# Patient Record
Sex: MALE | Race: WHITE
[De-identification: names, ages, dates, MRNs, and addresses within clinical notes are randomized per-mention and may not be internally consistent; named-entity substitution may affect disease eponyms.]

---

## 2017-09-18 ENCOUNTER — HOSPITAL ENCOUNTER (OUTPATIENT)
Dept: HOSPITAL 62 - SP | Age: 67
End: 2017-09-18
Attending: NURSE PRACTITIONER
Payer: OTHER GOVERNMENT

## 2017-09-18 DIAGNOSIS — L97.322: Primary | ICD-10-CM

## 2017-09-18 PROCEDURE — 93925 LOWER EXTREMITY STUDY: CPT

## 2017-09-18 NOTE — XCELERA REPORT
[address] [address]

                             Tel: [phone]

                             Fax: [phone]



                    Lower Extremity Arterial Evaluation

____________________________________________________________________________



Name: JACOB BUSBY

MRN: [id number]                Age: 67 yrs

Gender: Male                   : 1950

Patient Status: Outpatient     Patient Location: 

Account #: [id number]

Study Date: 2017 01:12 PM

Accession #: [id number]

____________________________________________________________________________



Procedure: A color flow and duplex scan of the lower extremity arteries was

performed bilaterally with velocity and waveform anaylsis. Ankle brachial

indicies performed.

Reason For Study: ULCER





Ordering Physician: AMY CABRERA

Performed By: Sarahy Alejandro

____________________________________________________________________________



____________________________________________________________________________





Measurements and Calculations



                                   Right         Left

  CFA PSV                          105.6        124.0    cm/sec

  Prox PFA PSV                      42.9        115.0    cm/sec

  Prox SFA PSV                     102.0        107.5    cm/sec

  Mid SFA PSV                      -124.3       101.2    cm/sec

  Dist SFA PSV                     -85.5        -106.9   cm/sec

  Prox Pop A PSV                    53.4         58.5    cm/sec

  Dist QUITA PSV                      99.2        110.0    cm/sec

  Dist PTA PSV                     101.7        117.9    cm/sec

  Vinicio Pedis PSV                     99.2         88.9    cm/sec



____________________________________________________________________________



Right Side Arterial Evaluation

Normal velocity and triphasic waveforms noted from the Common Femoral

artery to the infrageniculate vessels.



0 % stenosis noted .



Ankle Brachial index was not obtainable due to non compressibility.



Left Side Arterial Evaluation

Normal velocity and triphasic waveforms noted from the Common Femoral

artery to the infrageniculate vessels.



0 % stenosis noted .



Ankle Brachial index was not obtainable due to non compressibility.

____________________________________________________________________________



Interpretation Summary

No hemodynamically significant lesions in the bilateral lower extremities,

on duplex imaging, at rest. In spite of normal duplex study, non

compressibility, suggests Atherosclerotic changes in the arterial wall.

____________________________________________________________________________



Electronically signed by:      Lennox Williams      on 2017 04:19 PM



CC: GAVEL, EMILY > Williams, Lennox

## 2017-10-31 ENCOUNTER — HOSPITAL ENCOUNTER (OUTPATIENT)
Dept: HOSPITAL 62 - WC | Age: 67
End: 2017-10-31
Attending: NURSE PRACTITIONER
Payer: MEDICARE

## 2017-10-31 DIAGNOSIS — E11.621: Primary | ICD-10-CM

## 2017-10-31 DIAGNOSIS — L97.322: ICD-10-CM

## 2017-10-31 LAB
ALBUMIN SERPL-MCNC: 4.5 G/DL (ref 3.5–5)
ALP SERPL-CCNC: 102 U/L (ref 38–126)
ALT SERPL-CCNC: 30 U/L (ref 21–72)
ANION GAP SERPL CALC-SCNC: 13 MMOL/L (ref 5–19)
AST SERPL-CCNC: 20 U/L (ref 17–59)
BASOPHILS # BLD AUTO: 0 10^3/UL (ref 0–0.2)
BASOPHILS NFR BLD AUTO: 0.7 % (ref 0–2)
BILIRUB DIRECT SERPL-MCNC: 0.4 MG/DL (ref 0–0.4)
BILIRUB SERPL-MCNC: 0.5 MG/DL (ref 0.2–1.3)
BUN SERPL-MCNC: 27 MG/DL (ref 7–20)
CALCIUM: 9.2 MG/DL (ref 8.4–10.2)
CHLORIDE SERPL-SCNC: 103 MMOL/L (ref 98–107)
CO2 SERPL-SCNC: 29 MMOL/L (ref 22–30)
CREAT SERPL-MCNC: 1.22 MG/DL (ref 0.52–1.25)
CRP SERPL-MCNC: 9.5 MG/L (ref ?–10)
EOSINOPHIL # BLD AUTO: 0.1 10^3/UL (ref 0–0.6)
EOSINOPHIL NFR BLD AUTO: 2.2 % (ref 0–6)
ERYTHROCYTE [DISTWIDTH] IN BLOOD BY AUTOMATED COUNT: 16.2 % (ref 11.5–14)
ERYTHROCYTE [SEDIMENTATION RATE] IN BLOOD: 17 MM/HR (ref 0–20)
GLUCOSE SERPL-MCNC: 127 MG/DL (ref 75–110)
HCT VFR BLD CALC: 49 % (ref 37.9–51)
HGB BLD-MCNC: 16.4 G/DL (ref 13.5–17)
HGB HCT DIFFERENCE: 0.2
LYMPHOCYTES # BLD AUTO: 1.4 10^3/UL (ref 0.5–4.7)
LYMPHOCYTES NFR BLD AUTO: 20.6 % (ref 13–45)
MCH RBC QN AUTO: 31.8 PG (ref 27–33.4)
MCHC RBC AUTO-ENTMCNC: 33.5 G/DL (ref 32–36)
MCV RBC AUTO: 95 FL (ref 80–97)
MONOCYTES # BLD AUTO: 0.6 10^3/UL (ref 0.1–1.4)
MONOCYTES NFR BLD AUTO: 9.8 % (ref 3–13)
NEUTROPHILS # BLD AUTO: 4.4 10^3/UL (ref 1.7–8.2)
NEUTS SEG NFR BLD AUTO: 66.7 % (ref 42–78)
POTASSIUM SERPL-SCNC: 5.5 MMOL/L (ref 3.6–5)
PROT SERPL-MCNC: 7.7 G/DL (ref 6.3–8.2)
RBC # BLD AUTO: 5.17 10^6/UL (ref 4.35–5.55)
SODIUM SERPL-SCNC: 144.7 MMOL/L (ref 137–145)
WBC # BLD AUTO: 6.6 10^3/UL (ref 4–10.5)

## 2017-10-31 PROCEDURE — 36415 COLL VENOUS BLD VENIPUNCTURE: CPT

## 2017-10-31 PROCEDURE — 80053 COMPREHEN METABOLIC PANEL: CPT

## 2017-10-31 PROCEDURE — 83036 HEMOGLOBIN GLYCOSYLATED A1C: CPT

## 2017-10-31 PROCEDURE — 85025 COMPLETE CBC W/AUTO DIFF WBC: CPT

## 2017-10-31 PROCEDURE — 86140 C-REACTIVE PROTEIN: CPT

## 2017-10-31 PROCEDURE — 85652 RBC SED RATE AUTOMATED: CPT

## 2017-10-31 NOTE — RADIOLOGY REPORT (SQ)
EXAM DESCRIPTION:  FOOT LEFT COMPLETE



COMPLETED DATE/TIME:  10/31/2017 3:47 pm



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



COMPARISON:  None.



NUMBER OF VIEWS:  Three views.



TECHNIQUE:  AP, lateral and oblique  radiographic images acquired of the left foot.



LIMITATIONS:  None.



FINDINGS:  MINERALIZATION: Normal.

BONES: No fracture or dislocation.  No evidence of osteomyelitis.

JOINTS: No effusions.

SOFT TISSUES: No soft tissue swelling.  No foreign body.

OTHER: No other significant finding.



IMPRESSION:  There is no evidence of osteomyelitis.



TECHNICAL DOCUMENTATION:  JOB ID:  [id number]

 2011 [employer]- All Rights Reserved

## 2017-10-31 NOTE — RADIOLOGY REPORT (SQ)
EXAM DESCRIPTION:  ANKLE LEFT COMPLETE



COMPLETED DATE/TIME:  10/31/2017 3:47 pm



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



COMPARISON:  3/23/2015



NUMBER OF VIEWS:  Three views.



TECHNIQUE:  AP, lateral, and oblique radiographic images acquired of the left ankle.



LIMITATIONS:  None.



FINDINGS:  MINERALIZATION: Normal.

BONES: No fracture or dislocation.  No evidence of osteomyelitis.

JOINTS: No effusions.

SOFT TISSUES: No soft tissue swelling. No foreign body.

OTHER: No other significant finding.



IMPRESSION:  No acute abnormality.  There is no evidence of osteomyelitis



TECHNICAL DOCUMENTATION:  JOB ID:  [id number]

 2011 [employer]- All Rights Reserved

## 2019-02-26 ENCOUNTER — HOSPITAL ENCOUNTER (OUTPATIENT)
Dept: HOSPITAL 62 - SC | Age: 69
Discharge: HOME | End: 2019-02-26
Attending: OPHTHALMOLOGY
Payer: MEDICARE

## 2019-02-26 DIAGNOSIS — Z79.899: ICD-10-CM

## 2019-02-26 DIAGNOSIS — I11.9: ICD-10-CM

## 2019-02-26 DIAGNOSIS — G47.30: ICD-10-CM

## 2019-02-26 DIAGNOSIS — Z79.01: ICD-10-CM

## 2019-02-26 DIAGNOSIS — E11.3393: ICD-10-CM

## 2019-02-26 DIAGNOSIS — J44.9: ICD-10-CM

## 2019-02-26 DIAGNOSIS — H53.021: ICD-10-CM

## 2019-02-26 DIAGNOSIS — H17.89: ICD-10-CM

## 2019-02-26 DIAGNOSIS — I25.2: ICD-10-CM

## 2019-02-26 DIAGNOSIS — Z99.81: ICD-10-CM

## 2019-02-26 DIAGNOSIS — E78.00: ICD-10-CM

## 2019-02-26 DIAGNOSIS — H25.813: Primary | ICD-10-CM

## 2019-02-26 DIAGNOSIS — H52.4: ICD-10-CM

## 2019-02-26 DIAGNOSIS — Z79.51: ICD-10-CM

## 2019-02-26 DIAGNOSIS — Z79.4: ICD-10-CM

## 2019-02-26 DIAGNOSIS — H04.123: ICD-10-CM

## 2019-02-26 DIAGNOSIS — F17.210: ICD-10-CM

## 2019-02-26 PROCEDURE — 82962 GLUCOSE BLOOD TEST: CPT

## 2019-02-26 PROCEDURE — V2787 ASTIGMATISM-CORRECT FUNCTION: HCPCS

## 2019-02-26 PROCEDURE — 66984 XCAPSL CTRC RMVL W/O ECP: CPT

## 2019-02-26 RX ADMIN — TROPICAMIDE PRN DROP: 10 SOLUTION/ DROPS OPHTHALMIC at 07:52

## 2019-02-26 RX ADMIN — CYCLOPENTOLATE HYDROCHLORIDE AND PHENYLEPHRINE HYDROCHLORIDE PRN DROP: 2; 10 SOLUTION/ DROPS OPHTHALMIC at 07:52

## 2019-02-26 RX ADMIN — BESIFLOXACIN PRN DROP: 6 SUSPENSION OPHTHALMIC at 08:56

## 2019-02-26 RX ADMIN — TETRACAINE HYDROCHLORIDE PRN DROP: 25 LIQUID OPHTHALMIC at 07:52

## 2019-02-26 RX ADMIN — CYCLOPENTOLATE HYDROCHLORIDE AND PHENYLEPHRINE HYDROCHLORIDE PRN DROP: 2; 10 SOLUTION/ DROPS OPHTHALMIC at 08:15

## 2019-02-26 RX ADMIN — BESIFLOXACIN PRN DROP: 6 SUSPENSION OPHTHALMIC at 08:02

## 2019-02-26 RX ADMIN — CYCLOPENTOLATE HYDROCHLORIDE AND PHENYLEPHRINE HYDROCHLORIDE PRN DROP: 2; 10 SOLUTION/ DROPS OPHTHALMIC at 08:02

## 2019-02-26 RX ADMIN — TROPICAMIDE PRN DROP: 10 SOLUTION/ DROPS OPHTHALMIC at 08:15

## 2019-02-26 RX ADMIN — TROPICAMIDE PRN DROP: 10 SOLUTION/ DROPS OPHTHALMIC at 08:02

## 2019-02-26 RX ADMIN — BESIFLOXACIN PRN DROP: 6 SUSPENSION OPHTHALMIC at 07:52

## 2019-02-26 RX ADMIN — TETRACAINE HYDROCHLORIDE PRN DROP: 25 LIQUID OPHTHALMIC at 08:15

## 2019-02-26 NOTE — [facility] OPERATIVE REPORT E
[facility] Operative Report



NAME: JACOB BUSBY

                                      MRN: [id number]

                                      AGE: 68Y

DATE OF SURGERY: 02/26/2019          ROOM:



PREOPERATIVE DIAGNOSIS:

Cataract, left eye.



POSTOPERATIVE DIAGNOSIS:

Cataract, left eye.



PROCEDURE PERFORMED:

Phacoemulsification with toric intraocular lens implant, left eye.



SURGEON:

TU OTTO M.D.



ANESTHESIA:

Topical with MAC.



PROCEDURE:

The patient was brought to the operating room and placed on the operative

table.  Following tetracaine drops, topical anesthesia was administered. 

This consisted of instrument wipe pledgets soaked in a solution of 4%

Xylocaine mixed with 0.75% Marcaine in a 1:2 ratio.  A 2 x 1 cm pledget was

placed in the superior fornix.  A 1 x 1 cm pledget was placed in the

inferior fornix.  The eye was patched shut for 5 minutes.  The patch was

removed.  The eye was sterilely prepped and draped in the usual manner. 

Lid speculum was placed in the eye.  The pledgets were removed and 4-0

black silk sutures were placed around the superior and the inferior rectus

muscles to be used as traction.  A conjunctival peritomy was made at the 10

o'clock position.  Hemostasis was obtained with bipolar cautery.  A

posterior limbal groove was created using a crescent knife and dissected

anteriorly towards the cornea.  A sharp point blade was used to create a

paracentesis site at the 2 o'clock position.  A 2.4 mm keratome was used to

enter the anterior chamber through the groove.  Viscoelastic was injected

into the anterior chamber.  An anterior capsulotomy was performed using

Utrata forceps in a capsulorrhexis fashion.  Hydrodissection and

hydrodelineation were performed.  Phacoemulsification was performed in

divide-and-conquer technique.  Total phaco time was 9.51 CDE.



Following this, the I/A unit was used to remove residual cortex.

Viscoelastic was injected into the capsular bag.  Intraocular lens model

[id number], 20.5 diopters, serial number [id number], was placed in the

capsular bag.  The I/A unit was used to remove residual viscoelastic.  The

wound was seen to be watertight under high and low pressure, and no sutures

were placed.  The intraocular lens was well centered.  The pressure was

adjusted in the eye to normal pressure.  The 4-0 black silk sutures and lid

speculum were removed.  A drop of Cosopt was placed in the eye at the end

of surgery.  The eye was shielded after Besivance drops were placed.  The

patient tolerated the procedure well and was sent to the recovery room in

good condition.



Prior to the surgery, the patient was placed in the seated position and the

0, 270, and 180-degree axis of the eye was marked using a marking level. 

Prior to placing the lens implant the 106 axis was marked on the eye and

the lens was centered at this axis.



DICTATING PHYSICIAN: TU OTTO M.D.



1209M              DT: 02/26/2019 0901

PHY#: [id number]        DD: 02/26/2019 0857

ID:   [id number]               JOB#: [id number]       ACCT: [id number]



cc:TU OTTO M.D.

>

## 2019-02-26 NOTE — [facility] DISCHARGE SUMMARY E
[facility] Discharge Summary



NAME: JACOB BUSBY

                                      MRN: [id number]

                                      AGE: 68Y

ADMITTED: 02/26/2019           DISCHARGED: 02/26/2019



FINAL DIAGNOSIS:

Cataract, left eye.



HOSPITAL COURSE:

The patient is a 68-year-old gentleman who underwent uneventful cataract

extraction with toric intraocular lens implant, left eye, on 02/26/2019. 

He will be discharged to home.  He was instructed to resume preoperative

medications; to take Tylenol as needed for discomfort; to keep his eye

shielded; to use Vigamox, Durezol, and Ilevro at 3 p.m. and 8 p.m.; and to

follow up in my office in 1 day.



DICTATING PHYSICIAN: TU OTTO M.D.



1209M              DT: 02/26/2019 0904

PHY#: [id number]        DD: 02/26/2019 0857

ID:   [id number]               JOB#: [id number]       ACCT: [id number]



cc:TU OTTO M.D.

>

## 2019-12-17 ENCOUNTER — HOSPITAL ENCOUNTER (OUTPATIENT)
Dept: HOSPITAL 62 - OROUT | Age: 69
Discharge: HOME | End: 2019-12-17
Attending: SURGERY
Payer: MEDICARE

## 2019-12-17 VITALS — DIASTOLIC BLOOD PRESSURE: 64 MMHG | SYSTOLIC BLOOD PRESSURE: 111 MMHG

## 2019-12-17 DIAGNOSIS — Z53.9: ICD-10-CM

## 2019-12-17 DIAGNOSIS — I83.023: Primary | ICD-10-CM

## 2019-12-17 DIAGNOSIS — I44.7: ICD-10-CM

## 2019-12-17 LAB
ADD MANUAL DIFF: NO
ANION GAP SERPL CALC-SCNC: 13 MMOL/L (ref 5–19)
APTT BLD: 41.1 SEC (ref 23.5–35.8)
BASOPHILS # BLD AUTO: 0 10^3/UL (ref 0–0.2)
BASOPHILS NFR BLD AUTO: 0.5 % (ref 0–2)
BUN SERPL-MCNC: 35 MG/DL (ref 7–20)
CALCIUM: 9 MG/DL (ref 8.4–10.2)
CHLORIDE SERPL-SCNC: 102 MMOL/L (ref 98–107)
CO2 SERPL-SCNC: 27 MMOL/L (ref 22–30)
EOSINOPHIL # BLD AUTO: 0.2 10^3/UL (ref 0–0.6)
EOSINOPHIL NFR BLD AUTO: 2.4 % (ref 0–6)
ERYTHROCYTE [DISTWIDTH] IN BLOOD BY AUTOMATED COUNT: 15.4 % (ref 11.5–14)
GLUCOSE SERPL-MCNC: 99 MG/DL (ref 75–110)
HCT VFR BLD CALC: 46.2 % (ref 37.9–51)
HGB BLD-MCNC: 15.7 G/DL (ref 13.5–17)
INR PPP: 2.27
LYMPHOCYTES # BLD AUTO: 1.7 10^3/UL (ref 0.5–4.7)
LYMPHOCYTES NFR BLD AUTO: 23.4 % (ref 13–45)
MCH RBC QN AUTO: 32.2 PG (ref 27–33.4)
MCHC RBC AUTO-ENTMCNC: 34 G/DL (ref 32–36)
MCV RBC AUTO: 95 FL (ref 80–97)
MONOCYTES # BLD AUTO: 0.7 10^3/UL (ref 0.1–1.4)
MONOCYTES NFR BLD AUTO: 9.4 % (ref 3–13)
NEUTROPHILS # BLD AUTO: 4.7 10^3/UL (ref 1.7–8.2)
NEUTS SEG NFR BLD AUTO: 64.3 % (ref 42–78)
PLATELET # BLD: 122 10^3/UL (ref 150–450)
POTASSIUM SERPL-SCNC: 4.5 MMOL/L (ref 3.6–5)
PROTHROMBIN TIME: 25.4 SEC (ref 11.4–15.4)
RBC # BLD AUTO: 4.87 10^6/UL (ref 4.35–5.55)
TOTAL CELLS COUNTED % (AUTO): 100 %
WBC # BLD AUTO: 7.3 10^3/UL (ref 4–10.5)

## 2019-12-17 PROCEDURE — 85610 PROTHROMBIN TIME: CPT

## 2019-12-17 PROCEDURE — 93005 ELECTROCARDIOGRAM TRACING: CPT

## 2019-12-17 PROCEDURE — 36415 COLL VENOUS BLD VENIPUNCTURE: CPT

## 2019-12-17 PROCEDURE — 93010 ELECTROCARDIOGRAM REPORT: CPT

## 2019-12-17 PROCEDURE — 85730 THROMBOPLASTIN TIME PARTIAL: CPT

## 2019-12-17 PROCEDURE — 80048 BASIC METABOLIC PNL TOTAL CA: CPT

## 2019-12-17 PROCEDURE — 85025 COMPLETE CBC W/AUTO DIFF WBC: CPT

## 2019-12-17 NOTE — EKG REPORT
SEVERITY:- ABNORMAL ECG -

SINUS RHYTHM

LOW VOLTAGE THROUGHOUT

CONSIDER ANTERIOR INFARCT

INCOMPLETE LEFT BUNDLE BRANCH BLOCK

:

Confirmed by: Aggie Hall 17-Dec-2019 22:38:18

## 2020-10-06 ENCOUNTER — HOSPITAL ENCOUNTER (OUTPATIENT)
Dept: HOSPITAL 62 - RAD | Age: 70
End: 2020-10-06
Attending: PHYSICIAN ASSISTANT
Payer: OTHER GOVERNMENT

## 2020-10-06 DIAGNOSIS — C34.32: Primary | ICD-10-CM

## 2020-10-06 PROCEDURE — 78815 PET IMAGE W/CT SKULL-THIGH: CPT

## 2020-10-06 PROCEDURE — A9552 F18 FDG: HCPCS

## 2020-10-08 NOTE — RADIOLOGY REPORT (SQ)
EXAM DESCRIPTION:  PET CT SKULL/THIGH



IMAGES COMPLETED DATE/TIME:  10/6/2020 2:44 pm



REASON FOR STUDY:  R91.1 SOLITARY PULMONARY NODULE R91.1  SOLITARY PULMONARY NODULE



COMPARISON:  None.



RADIONUCLIDE AND DOSE:  11.5 mCi F18 FDG

The route of agent administration: Intravenous



FASTING BLOOD SUGAR:  164 mg/dl



CONTRAST TYPE AND DOSE:  No CT contrast given.



TECHNIQUE:  Blood glucose level was verified.  Above dose of FDG was injected intravenously.  2-D seg
mented attenuation correction images were obtained from the base of the skull to the midthighs.  Nonc
ontrast CT images were obtained for attenuation correction and fusion with emission images.  CT image
s were performed without oral or intravenous contrast and are not sensitive for parenchymal lesions. 
 A series of overlapping emission PET images were obtained.  Images reviewed and manipulated at [facility]
[employer] work station by the radiologist.  Images stored on PACS.



LIMITATIONS:  Body habitus.  Positioning.



FINDINGS:  HEAD AND NECK: No areas of abnormal metabolic activity in the soft tissues of the head and
 neck.

CHEST: No significant areas of abnormal metabolic activity.  Approximately 18 mm part solid nodule jimenez
perior segment left lower lobe measures 1.1 SUV.

ABDOMEN AND PELVIS: No areas of abnormal metabolic activity in the abdomen or pelvis.  Expected physi
ologic activity is present in the genitourinary system and bowel.

PROXIMAL LOWER EXTREMITIES: No areas of abnormal metabolic activity in the soft tissues of the lower 
extremities.

BONES: No abnormal metabolic activity in the visualized skeleton.

ADDITIONAL CT FINDINGS: Non hypermetabolic partially calcified right apical nodule.  Mild -moderate e
mphysema.

OTHER: Blood pool activity 1.0 SUV.  Liver background 1.7 SUV.



IMPRESSION:  Non hypermetabolic pulmonary nodule.



TECHNICAL DOCUMENTATION:  JOB ID:  [id number]

 2011 [employer]- All Rights Reserved



Reading location - IP/workstation name: [email or web address]